# Patient Record
Sex: MALE | Race: WHITE
[De-identification: names, ages, dates, MRNs, and addresses within clinical notes are randomized per-mention and may not be internally consistent; named-entity substitution may affect disease eponyms.]

---

## 2018-01-04 ENCOUNTER — HOSPITAL ENCOUNTER (EMERGENCY)
Dept: HOSPITAL 17 - PHED | Age: 48
Discharge: HOME | End: 2018-01-04
Payer: COMMERCIAL

## 2018-01-04 VITALS — BODY MASS INDEX: 28.09 KG/M2 | WEIGHT: 200.62 LBS | HEIGHT: 71 IN

## 2018-01-04 VITALS
DIASTOLIC BLOOD PRESSURE: 67 MMHG | RESPIRATION RATE: 16 BRPM | HEART RATE: 67 BPM | TEMPERATURE: 97.4 F | SYSTOLIC BLOOD PRESSURE: 140 MMHG | OXYGEN SATURATION: 100 %

## 2018-01-04 DIAGNOSIS — N43.40: Primary | ICD-10-CM

## 2018-01-04 DIAGNOSIS — Z79.899: ICD-10-CM

## 2018-01-04 DIAGNOSIS — N45.1: ICD-10-CM

## 2018-01-04 DIAGNOSIS — I86.1: ICD-10-CM

## 2018-01-04 DIAGNOSIS — N43.3: ICD-10-CM

## 2018-01-04 DIAGNOSIS — F17.200: ICD-10-CM

## 2018-01-04 LAB
COLOR UR: YELLOW
GLUCOSE UR STRIP-MCNC: (no result) MG/DL
HGB UR QL STRIP: (no result)
KETONES UR STRIP-MCNC: 15 MG/DL
LEUKOCYTE ESTERASE UR QL STRIP: (no result) /HPF (ref 0–5)
NITRITE UR QL STRIP: (no result)
SP GR UR STRIP: 1.03 (ref 1–1.03)
SQUAMOUS #/AREA URNS HPF: (no result) /HPF (ref 0–5)
URINE LEUKOCYTE ESTERASE: (no result)

## 2018-01-04 PROCEDURE — 76870 US EXAM SCROTUM: CPT

## 2018-01-04 PROCEDURE — 93975 VASCULAR STUDY: CPT

## 2018-01-04 PROCEDURE — 81001 URINALYSIS AUTO W/SCOPE: CPT

## 2018-01-04 PROCEDURE — 87591 N.GONORRHOEAE DNA AMP PROB: CPT

## 2018-01-04 PROCEDURE — 87491 CHLMYD TRACH DNA AMP PROBE: CPT

## 2018-01-04 NOTE — RADRPT
EXAM DATE/TIME:  01/04/2018 18:56 

 

[facility] COMPARISON:     

No previous studies available for comparison.

        

 

 

INDICATIONS :     

Testicular pain. 

                     

 

MEDICAL HISTORY :          

Tobacco use. 

 

SURGICAL HISTORY :      

Vasectomy. Liposuction. 

 

ENCOUNTER:      

Subsequent

 

ACUITY:      

1 day

 

PAIN SCORE:      

7/10

 

LOCATION:      

Bilateral  scrotum. 

                     

 

MEASUREMENTS:     

 

RIGHT TESTICLE:        

4.7 x 3.7 x 2.5cm     

 

LEFT TESTICLE:        

4.5 x 3.4 x 2.5cm     

 

FINDINGS:     

Testicles are normal with normal blood flow documented on color Doppler imaging. Right epididymis con
tains a 4.2 mm epididymal head cysts. Small bilateral hydroceles. Left epididymal cyst measuring 8 mm
. Left varicocele.

 

CONCLUSION:     

Small bilateral hydroceles, left varicocele and epididymal cysts.

 

 

 

 James Douglas MD on January 04, 2018 at 19:20           

Board Certified Radiologist.

 This report was verified electronically.

## 2018-01-04 NOTE — PD
HPI


Chief Complaint:   Complaint


Time Seen by Provider:  17:54


Travel History


International Travel<30 days:  No


Contact w/Intl Traveler<30days:  No


Traveled to known affect area:  No





History of Present Illness


HPI


Patient is a 47 year old male with a history of recurrent epididymitis presents 

to the ER for evaluation of left testicular pain starting today.  States he was 

placed on antibiotics and vicodin in the past.  Denies history of anal sex, std'

s, new partners.  States no fevers, no abdominal pain, no nausea nor vomiting.  

States pain moderate, gradually worsening, left testicle, context as above.





PFSH


Past Medical History


Medical History:  Denies Significant Hx


Diminished Hearing:  No


Immunizations Current:  Yes


Tetanus Vaccination:  < 5 Years


Influenza Vaccination:  No





Past Surgical History


Genitourinary Surgery:  Yes (vesectomy )


Other Surgery:  Yes (liposuction)





Social History


Alcohol Use:  Yes (OCC)


Tobacco Use:  Yes (<1/2 PPD)


Substance Use:  No





Allergies-Medications


(Allergen,Severity, Reaction):  


Coded Allergies:  


     No Known Allergies (Unverified  Adverse Reaction, Unknown, 1/4/18)


Reported Meds & Prescriptions





Reported Meds & Active Scripts


Active


Ultram (Tramadol HCl) 50 Mg Tab 50 Mg PO Q8H PRN


Levaquin (Levofloxacin) 750 Mg Tablet 750 Mg PO DAILY 14 Days








Review of Systems


Except as stated in HPI:  all other systems reviewed are Neg





Physical Exam


Narrative


GENERAL: WD/wn in nad


SKIN: Warm and dry.


HEAD: Atraumatic. Normocephalic. 


EYES: Pupils equal and round. No scleral icterus. No injection or drainage. 


ENT: No nasal bleeding or discharge.  Mucous membranes pink and moist.


NECK: Trachea midline. No JVD. 


CARDIOVASCULAR: Regular rate and rhythm.  


RESPIRATORY: No accessory muscle use. Clear to auscultation. Breath sounds 

equal bilaterally. 


GASTROINTESTINAL: Abdomen soft, non-tender, nondistended. Hepatic and splenic 

margins not palpable. 


GENITOURINARY:  There is some minmal tenderness at the left epididymis.  No 

mass palpated on either teste.  Scrotal skin normal.  Varicocele bilaterally.  

Patient has intact cremasteric reflex.


MUSCULOSKELETAL: Extremities without clubbing, cyanosis, or edema. No obvious 

deformities. 


NEUROLOGICAL: Awake and alert. No obvious cranial nerve deficits.  Motor 

grossly within normal limits. Five out of 5 muscle strength in the arms and 

legs.  Normal speech.


PSYCHIATRIC: Appropriate mood and affect; insight and judgment normal.





Data


Data


Last Documented VS





Vital Signs








  Date Time  Temp Pulse Resp B/P (MAP) Pulse Ox O2 Delivery O2 Flow Rate FiO2


 


1/4/18 17:36 97.4 67 16 140/67 (91) 100   








Orders





 Orders


Us Testicles W Doppler (1/4/18 )


Urinalysis - C+S If Indicated (1/4/18 17:54)


Gc And Chlamydia Pcr (1/4/18 17:54)


Ed Discharge Order (1/4/18 19:58)





Labs





Laboratory Tests








Test


  1/4/18


18:00


 


Urine Color YELLOW 


 


Urine Turbidity CLEAR 


 


Urine pH 6.0 


 


Urine Specific Gravity 1.026 


 


Urine Protein NEG mg/dL 


 


Urine Glucose (UA) NEG mg/dL 


 


Urine Ketones 15 mg/dL 


 


Urine Occult Blood NEG 


 


Urine Nitrite NEG 


 


Urine Bilirubin NEG 


 


Urine Leukocyte Esterase NEG 


 


Urine WBC 0-2 /hpf 


 


Urine Squamous Epithelial


Cells 0-5 /hpf 


 


 


Microscopic Urinalysis Comment


  CULT NOT


INDICATED











MDM


Medical Decision Making


Medical Screen Exam Complete:  Yes


Emergency Medical Condition:  Yes


Differential Diagnosis


Epididymitis, STD, orchitis, torsion unlikely.


Narrative Course





Last 24 hours Impressions








Scrotum Ultrasound 1/4/18 0000 Signed





Impressions: 





 Service Date/Time:  Thursday, January 4, 2018 18:56 - CONCLUSION:  Small 





 bilateral hydroceles, left varicocele and epididymal cysts.     James Douglas MD 





Discussed results with patient and recommended antibiotics and follow up with 

urology.  The patient is comfortable appearing and plans to drive himself home.

  Offered non-sedating pain medication and declines.  Discussed symptomatic 

management and return to ed criteria.





Diagnosis





 Primary Impression:  


 Epididymal cyst


 Additional Impression:  


 Epididymitis


Referrals:  


Nasim Tinajero MD





***Additional Instructions:  


Call your primary care physician for an appointment and Dr. Tinajero (urologist).


***Med/Other Pt SpecificInfo:  Prescription(s) given


Scripts


Tramadol (Ultram) 50 Mg Tab


50 MG PO Q8H Y for PAIN, #12 TAB 0 Refills


   Prov: Wojciech Hartman MD         1/4/18 


Levofloxacin (Levaquin) 750 Mg Tablet


750 MG PO DAILY for Infection for 14 Days, #14 TAB 0 Refills


   Prov: Wojciech Hartman MD         1/4/18


Disposition:  01 DISCHARGE HOME


Condition:  Stable











Wojciech Hartman MD Jan 4, 2018 18:32